# Patient Record
Sex: MALE | Race: WHITE | NOT HISPANIC OR LATINO | Employment: STUDENT | URBAN - METROPOLITAN AREA
[De-identification: names, ages, dates, MRNs, and addresses within clinical notes are randomized per-mention and may not be internally consistent; named-entity substitution may affect disease eponyms.]

---

## 2018-02-28 ENCOUNTER — APPOINTMENT (OUTPATIENT)
Dept: RADIOLOGY | Facility: MEDICAL CENTER | Age: 23
End: 2018-02-28
Attending: ANESTHESIOLOGY
Payer: COMMERCIAL

## 2018-02-28 ENCOUNTER — HOSPITAL ENCOUNTER (OUTPATIENT)
Facility: MEDICAL CENTER | Age: 23
End: 2018-02-28
Attending: ORTHOPAEDIC SURGERY | Admitting: ORTHOPAEDIC SURGERY
Payer: COMMERCIAL

## 2018-02-28 ENCOUNTER — APPOINTMENT (OUTPATIENT)
Dept: RADIOLOGY | Facility: MEDICAL CENTER | Age: 23
End: 2018-02-28
Attending: ORTHOPAEDIC SURGERY
Payer: COMMERCIAL

## 2018-02-28 VITALS
SYSTOLIC BLOOD PRESSURE: 122 MMHG | OXYGEN SATURATION: 96 % | RESPIRATION RATE: 16 BRPM | HEIGHT: 68 IN | DIASTOLIC BLOOD PRESSURE: 64 MMHG | HEART RATE: 80 BPM | BODY MASS INDEX: 19.45 KG/M2 | TEMPERATURE: 99.1 F | WEIGHT: 128.31 LBS

## 2018-02-28 DIAGNOSIS — S52.032A: ICD-10-CM

## 2018-02-28 PROCEDURE — 160046 HCHG PACU - 1ST 60 MINS PHASE II: Performed by: ORTHOPAEDIC SURGERY

## 2018-02-28 PROCEDURE — 160009 HCHG ANES TIME/MIN: Performed by: ORTHOPAEDIC SURGERY

## 2018-02-28 PROCEDURE — 700102 HCHG RX REV CODE 250 W/ 637 OVERRIDE(OP)

## 2018-02-28 PROCEDURE — A6222 GAUZE <=16 IN NO W/SAL W/O B: HCPCS | Performed by: ORTHOPAEDIC SURGERY

## 2018-02-28 PROCEDURE — 160022 HCHG BLOCK: Performed by: ORTHOPAEDIC SURGERY

## 2018-02-28 PROCEDURE — C1713 ANCHOR/SCREW BN/BN,TIS/BN: HCPCS | Performed by: ORTHOPAEDIC SURGERY

## 2018-02-28 PROCEDURE — 160048 HCHG OR STATISTICAL LEVEL 1-5: Performed by: ORTHOPAEDIC SURGERY

## 2018-02-28 PROCEDURE — 700111 HCHG RX REV CODE 636 W/ 250 OVERRIDE (IP)

## 2018-02-28 PROCEDURE — 500881 HCHG PACK, EXTREMITY: Performed by: ORTHOPAEDIC SURGERY

## 2018-02-28 PROCEDURE — 71045 X-RAY EXAM CHEST 1 VIEW: CPT

## 2018-02-28 PROCEDURE — 501838 HCHG SUTURE GENERAL: Performed by: ORTHOPAEDIC SURGERY

## 2018-02-28 PROCEDURE — 160041 HCHG SURGERY MINUTES - EA ADDL 1 MIN LEVEL 4: Performed by: ORTHOPAEDIC SURGERY

## 2018-02-28 PROCEDURE — A4565 SLINGS: HCPCS | Performed by: ORTHOPAEDIC SURGERY

## 2018-02-28 PROCEDURE — 502000 HCHG MISC OR IMPLANTS RC 0278: Performed by: ORTHOPAEDIC SURGERY

## 2018-02-28 PROCEDURE — 700101 HCHG RX REV CODE 250

## 2018-02-28 PROCEDURE — 500562 HCHG FIBERWIRE: Performed by: ORTHOPAEDIC SURGERY

## 2018-02-28 PROCEDURE — 160002 HCHG RECOVERY MINUTES (STAT): Performed by: ORTHOPAEDIC SURGERY

## 2018-02-28 PROCEDURE — 160029 HCHG SURGERY MINUTES - 1ST 30 MINS LEVEL 4: Performed by: ORTHOPAEDIC SURGERY

## 2018-02-28 PROCEDURE — 160035 HCHG PACU - 1ST 60 MINS PHASE I: Performed by: ORTHOPAEDIC SURGERY

## 2018-02-28 PROCEDURE — A9270 NON-COVERED ITEM OR SERVICE: HCPCS

## 2018-02-28 PROCEDURE — 73080 X-RAY EXAM OF ELBOW: CPT | Mod: LT

## 2018-02-28 PROCEDURE — 160025 RECOVERY II MINUTES (STATS): Performed by: ORTHOPAEDIC SURGERY

## 2018-02-28 DEVICE — SCREW CORT 3.5X28MM ST HEX (4TX6+1TX4+1TX3=31)(SDS=22): Type: IMPLANTABLE DEVICE | Site: ELBOW | Status: FUNCTIONAL

## 2018-02-28 DEVICE — SCREW CORT 3.5X24MM ST HEX (4TX6+1TX4+1TX3=31)(SDS=22): Type: IMPLANTABLE DEVICE | Site: ELBOW | Status: FUNCTIONAL

## 2018-02-28 DEVICE — WIRE K 1.6 X 150 292.16 (10EA/PK) (11TX10+2TX6+1TX20=142)(CYC=30): Type: IMPLANTABLE DEVICE | Site: ELBOW | Status: FUNCTIONAL

## 2018-02-28 DEVICE — IMPLANTABLE DEVICE: Type: IMPLANTABLE DEVICE | Site: ELBOW | Status: FUNCTIONAL

## 2018-02-28 DEVICE — SCREW CORT 3.5X26MM ST HEX (4TX6+1TX4+1TX3=31)(SDS=22): Type: IMPLANTABLE DEVICE | Site: ELBOW | Status: FUNCTIONAL

## 2018-02-28 RX ORDER — IBUPROFEN 800 MG/1
800 TABLET ORAL 2 TIMES DAILY PRN
Status: ON HOLD | COMMUNITY
End: 2018-02-28

## 2018-02-28 RX ORDER — HYDROCODONE BITARTRATE AND ACETAMINOPHEN 5; 325 MG/1; MG/1
1 TABLET ORAL 2 TIMES DAILY PRN
Status: ON HOLD | COMMUNITY
End: 2018-02-28

## 2018-02-28 RX ORDER — AZITHROMYCIN 250 MG/1
250 TABLET, FILM COATED ORAL DAILY
COMMUNITY

## 2018-02-28 RX ORDER — DIPHENHYDRAMINE HYDROCHLORIDE 50 MG/ML
25 INJECTION INTRAMUSCULAR; INTRAVENOUS EVERY 6 HOURS PRN
Status: DISCONTINUED | OUTPATIENT
Start: 2018-02-28 | End: 2018-02-28 | Stop reason: HOSPADM

## 2018-02-28 RX ORDER — IBUPROFEN 800 MG/1
800 TABLET ORAL 2 TIMES DAILY PRN
Qty: 60 TAB | Refills: 0 | Status: SHIPPED | OUTPATIENT
Start: 2018-02-28

## 2018-02-28 RX ORDER — LIDOCAINE AND PRILOCAINE 25; 25 MG/G; MG/G
1 CREAM TOPICAL
Status: DISCONTINUED | OUTPATIENT
Start: 2018-02-28 | End: 2018-02-28 | Stop reason: HOSPADM

## 2018-02-28 RX ORDER — ONDANSETRON 2 MG/ML
4 INJECTION INTRAMUSCULAR; INTRAVENOUS EVERY 4 HOURS PRN
Status: DISCONTINUED | OUTPATIENT
Start: 2018-02-28 | End: 2018-02-28 | Stop reason: HOSPADM

## 2018-02-28 RX ORDER — LIDOCAINE HYDROCHLORIDE 10 MG/ML
INJECTION, SOLUTION INFILTRATION; PERINEURAL
Status: COMPLETED
Start: 2018-02-28 | End: 2018-02-28

## 2018-02-28 RX ORDER — SCOLOPAMINE TRANSDERMAL SYSTEM 1 MG/1
1 PATCH, EXTENDED RELEASE TRANSDERMAL
Status: DISCONTINUED | OUTPATIENT
Start: 2018-02-28 | End: 2018-02-28 | Stop reason: HOSPADM

## 2018-02-28 RX ORDER — OXYCODONE HYDROCHLORIDE 5 MG/1
5 TABLET ORAL EVERY 6 HOURS PRN
Qty: 20 TAB | Refills: 0 | Status: SHIPPED | OUTPATIENT
Start: 2018-02-28 | End: 2018-03-07

## 2018-02-28 RX ORDER — MIDAZOLAM HYDROCHLORIDE 1 MG/ML
INJECTION INTRAMUSCULAR; INTRAVENOUS
Status: DISCONTINUED
Start: 2018-02-28 | End: 2018-02-28 | Stop reason: HOSPADM

## 2018-02-28 RX ORDER — LIDOCAINE HYDROCHLORIDE 10 MG/ML
0.5 INJECTION, SOLUTION INFILTRATION; PERINEURAL
Status: DISCONTINUED | OUTPATIENT
Start: 2018-02-28 | End: 2018-02-28 | Stop reason: HOSPADM

## 2018-02-28 RX ORDER — SODIUM CHLORIDE, SODIUM LACTATE, POTASSIUM CHLORIDE, CALCIUM CHLORIDE 600; 310; 30; 20 MG/100ML; MG/100ML; MG/100ML; MG/100ML
INJECTION, SOLUTION INTRAVENOUS CONTINUOUS
Status: DISCONTINUED | OUTPATIENT
Start: 2018-02-28 | End: 2018-02-28 | Stop reason: HOSPADM

## 2018-02-28 RX ORDER — OXYCODONE HYDROCHLORIDE 5 MG/1
10 TABLET ORAL EVERY 4 HOURS PRN
Status: DISCONTINUED | OUTPATIENT
Start: 2018-02-28 | End: 2018-02-28 | Stop reason: HOSPADM

## 2018-02-28 RX ORDER — HALOPERIDOL 5 MG/ML
1 INJECTION INTRAMUSCULAR EVERY 6 HOURS PRN
Status: DISCONTINUED | OUTPATIENT
Start: 2018-02-28 | End: 2018-02-28 | Stop reason: HOSPADM

## 2018-02-28 RX ORDER — OXYCODONE HYDROCHLORIDE 5 MG/1
5 TABLET ORAL EVERY 4 HOURS PRN
Status: DISCONTINUED | OUTPATIENT
Start: 2018-02-28 | End: 2018-02-28 | Stop reason: HOSPADM

## 2018-02-28 RX ORDER — DEXAMETHASONE SODIUM PHOSPHATE 4 MG/ML
4 INJECTION, SOLUTION INTRA-ARTICULAR; INTRALESIONAL; INTRAMUSCULAR; INTRAVENOUS; SOFT TISSUE
Status: DISCONTINUED | OUTPATIENT
Start: 2018-02-28 | End: 2018-02-28 | Stop reason: HOSPADM

## 2018-02-28 RX ADMIN — LIDOCAINE HYDROCHLORIDE 0.5 ML: 10 INJECTION, SOLUTION INFILTRATION; PERINEURAL at 10:10

## 2018-02-28 RX ADMIN — SODIUM CHLORIDE, SODIUM LACTATE, POTASSIUM CHLORIDE, CALCIUM CHLORIDE: 600; 310; 30; 20 INJECTION, SOLUTION INTRAVENOUS at 10:10

## 2018-02-28 ASSESSMENT — PAIN SCALES - GENERAL
PAINLEVEL_OUTOF10: 0
PAINLEVEL_OUTOF10: 4
PAINLEVEL_OUTOF10: 0
PAINLEVEL_OUTOF10: 0

## 2018-02-28 NOTE — OR NURSING
Pt to PACU s/p ORIF of left elbow. VSS throughout stay, NSR on monitor, Bps WNL. Maintaining sats on RA. Surgical incision WNL, dressing CDI, arm in sling. Tolerating PO liquids with no c/o nausea. LUE numb and heavy d/t PNB. Not requiring analgesics at this time. Updated pt and mom to POC, emotional support provided. Stable for transfer to phase two. Report to FILIPPO Gramajo.

## 2018-02-28 NOTE — OP REPORT
DATE OF SERVICE:  02/28/2018    PREOPERATIVE DIAGNOSIS:  Left displaced intraarticular olecranon fracture.    PREOPERATIVE DIAGNOSIS:  Left displaced intraarticular olecranon fracture.    PROCEDURE:  Open reduction and internal fixation, left olecranon fracture.    SURGEON:  Chance Pruett MD    ASSISTANT:  None.    ANESTHESIOLOGIST:  Andres Duff MD    ANESTHESIA:  General.    SPECIMENS:  None.    ESTIMATED BLOOD LOSS:  Minimal.    COMPLICATIONS:  None.    TOURNIQUET TIME:  1 hour at 250 mmHg.    OPERATIVE INDICATIONS:  The patient is a pleasant 22-year-old male who   sustained a ground level fall, suffered a left displaced intraarticular   olecranon fracture.  Radiographs demonstrate the fracture has a normal skin   envelope besides from a small abrasion laterally, normal neurovascular exam.    Given these findings, he is an appropriate knee candidate for open reduction   and internal fixation of the olecranon fracture.  We discussed the risks,   benefits, and alternatives including the risks of infection, wound healing   complication, neurovascular injury, blood loss, DVT, PE, malunion, nonunion,   symptomatic hardware, stiffness, and medical risk of anesthesia.  We discussed   benefits including improved chance of healing and excellent alignment.  We   discussed alternatives including nonoperative management, which I did not   recommend.  He is happy with the plan and to proceed.  Informed consent was   signed and documented.  I met with him preoperatively and marked the operative   extremity with his agreement to proceed to the operating room at [facility].    OPERATIVE COURSE:  He underwent general anesthesia with Dr. Duff.  He also   underwent regional anesthesia by my request for postoperative pain control.    He was positioned in the right lateral decubitus position with left side up.    All bony prominences were well padded.  The left lower extremity was prepped   and draped in the sterile orthopedic  fashion using ChloraPrep and the surgical   team scrubbed in.  Procedural pause was conducted to verify correct patient,   correct extremity, presence of the surgeon's initials on the operative   extremity, and administration of IV antibiotics in this case Ancef.  Following   generalized agreement, posterior approach to the olecranon was performed   incising the skin and subcutaneous tissue sharply.  The fracture site was   identified.  We had to dissect through the midsubstance of the portion of the   triceps and split this in line with the incision.  The fracture was identified   with the majority of the triceps its insertion attached.  This insertion was   split in the midsubstance in line with its fibers to allow reduction of the   fracture.  The triceps mechanism minimally elevated off the very edge of the   fracture to allow exposure of the fracture with minimal further dissection was   carried out to avoid destabilizing the triceps insertion of the lower   extremity and the fracture.  We then debrided the fracture and reduced with   2-point reduction forceps and secured with two 0.062 inch crossing K wires.  A   Synthes 2.0 Olecranon plate was then selected in position of the wound and   secured distally with one 3.5 mm bicortical nonlocking screw and proximally   with one 2.4 mm unicortical locking screw.  We then placed another screw   distally in the in compression to compress the fracture.  Radiographs   demonstrated excellent reduction of the fracture.  We then placed additional   2.5 locking fixation proximally.  Tightened all screws by hand and then   thoroughly irrigated the wound with copious amounts of normal saline.  We then   closed the triceps mechanism with a #2 FiberWire and secured the triceps   mechanism to the plate by sewing into the 2 holes in the plate.  We then   closed in layers with 2-0 Vicryl sutures, 2-0 Vicryl sutures and staples.  The   wound was anesthetized with 14 mL of  Marcaine.  This amount was used given   his prior block.  We then placed a well-padded posterior splint and after   applying sterile dressings and the patient transferred to the recovery room in   stable condition with no complications.    POSTOPERATIVE PLAN:  1.  Nonweightbearing operative extremity, keep the splint clean, dry and   intact.  2.  Deep venous thrombosis prophylaxis with SCDs in the hospital, not upon   discharge.  3.  IV antibiotics - none.  4.  Follow up with the [facility] in 2 weeks with Dr. Bryan in my   absence for a wound check.       ____________________________________     MD SONG Verduzco / TONA    DD:  02/28/2018 14:20:42  DT:  02/28/2018 15:30:31    D#:  [id number]  Job#:  [id number]

## 2018-02-28 NOTE — DISCHARGE INSTRUCTIONS
ACTIVITY: Rest and take it easy for the first 24 hours.  A responsible adult is recommended to remain with you during that time.  It is normal to feel sleepy.  We encourage you to not do anything that requires balance, judgment or coordination.    MILD FLU-LIKE SYMPTOMS ARE NORMAL. YOU MAY EXPERIENCE GENERALIZED MUSCLE ACHES, THROAT IRRITATION, HEADACHE AND/OR SOME NAUSEA.    FOR 24 HOURS DO NOT:  Drive, operate machinery or run household appliances.  Drink beer or alcoholic beverages.   Make important decisions or sign legal documents.    SPECIAL INSTRUCTIONS: Non weight bearing LEFT arm.  Refer to this sheet in the next few weeks. These instructions provide you with information about caring for yourself after your procedure. Your health care provider may also give you more specific instructions. Your treatment has been planned according to current medical practices, but problems sometimes occur. Call your health care provider if you have any problems or questions after your procedure.  WHAT TO EXPECT AFTER THE PROCEDURE  After your procedure, it is common to have:  · Soreness.  · Mild swelling.  · Stiffness.  HOME CARE INSTRUCTIONS  If You Have a Splint:  · Wear it as directed by your health care provider. Remove it only as directed by your health care provider.  · Loosen the splint if your fingers become numb and tingle, or if they turn cold and blue.   Bathing  · Do not take baths, swim, or use a hot tub until your health care provider approves. Ask your health care provider if you can take showers. You may only be allowed to take sponge baths for bathing.  · If your health care provider approves bathing and showering, cover the splint with a watertight plastic bag to protect it from water. Do not let the splint get wet. If you were given a removable splint to wear, only remove it for bathing as directed by your health care provider.  · Keep the bandage (dressing) dry until your health care provider says it  can be removed.  Managing Pain, Stiffness, and Swelling  · Move your fingers often to avoid stiffness and to lessen swelling.  · Raise the injured area above the level of your heart while you are sitting or lying down.  · If directed, apply ice to the incision:  ¨ Put ice in a plastic bag.  ¨ Place a towel between your skin and the bag.  ¨ Leave the ice on for 20 minutes, 2-3 times per day.  Driving  · Do not drive or operate heavy machinery while taking pain medicine.  · Do not drive while wearing a splint on a hand that you use for driving.  Activity  · Return to your normal activities as directed by your health care provider. Ask your health care provider what activities are safe for you.  · Perform range-of-motion exercises only as directed by your health care provider.  Incision Care  · There are many different ways to close and cover an incision, including stitches (sutures), staples, and adhesive strips. Follow instructions from your health care provider about:  ¨ Incision care.  ¨ Dressing changes and removal.  ¨ Incision closure removal.  · Check your incision area every day for signs of infection. Watch for:  ¨ Redness, swelling, or pain.  ¨ Fluid, blood, or pus.  General Instructions  · Do not put pressure on any part of the splint until it is fully hardened. This may take several hours.  · If you were given a sling, wear it as directed by your health care provider.  · Keep the splint clean and dry.  · Do not use any tobacco products, including cigarettes, chewing tobacco, or electronic cigarettes. Tobacco can delay bone healing. If you need help quitting, ask your health care provider.  · Take medicines only as directed by your health care provider.  · Keep all follow-up visits as directed by your health care provider. This is important.  · Do not use the injured limb to support your body weight until your health care provider says that you can.  SEEK MEDICAL CARE IF:  · You have redness, swelling, or  "pain at the site of your incision.  · You have fluid, blood, or pus coming from your incision.  · You have a fever or chills.  · You notice a bad smell coming from your incision or your dressing.  · The edges of your incision break open after the sutures or staples have been removed.  SEEK IMMEDIATE MEDICAL CARE IF:  · You develop a rash.  · You have difficulty breathing.  · You have numbness or tingling in your hand or forearm.      Peripheral Nerve Block Discharge Instructions from Same Day Surgery and Inpatient :    What to Expect - Upper Extremity  · You may experience numbness and weakness in arm  on the same side as your surgery  · This is normal. For some people, this may be an unpleasant sensation. Be very careful with your numb limb  · Ask for help when you need it  Shoulder Surgery Side Effects  · In addition to numbness and weakness you may experience other symptoms  · Other nerves that are close to those nerves injected can also be affected by local anesthesia  · You may experience a hoarseness in your voice  · Your breathing may feel different  · You may also notice drooping of your eyelid, pupil constriction, and decreased sweating, on the side of your surgery  · All of these side effects are normal and will resolve when the local anesthetic wears off   Prevent Injury  · Protect the limb like a baby  · Beware of exposing your limb to extreme heat or cold or trauma  · The limb may be injured without you noticing because it is numb  · Keep the limb elevated whenever possible  · Do not sleep on the limb  · Change the position of the limb regularly  · Avoid putting pressure on your surgical limb  Pain Control  · The initial block on the day of surgery will make your extremity feel \"numb\"  · Any consecutive injection including prior to discharge from the hospital will make your extremity feel \"numb\"  · You may feel an aching or burning when the local anesthesia starts to wear off  · Take pain pills as " prescribed by your surgeon  · Call your surgeon or anesthesiologist if you do not have adequate pain control           DIET: To avoid nausea, slowly advance diet as tolerated, avoiding spicy or greasy foods for the first day.  Add more substantial food to your diet according to your physician's instructions.  Babies can be fed formula or breast milk as soon as they are hungry.  INCREASE FLUIDS AND FIBER TO AVOID CONSTIPATION.    SURGICAL DRESSING/BATHING: Shower with wound covered starting tomorrow morning.     FOLLOW-UP APPOINTMENT:  A follow-up appointment should be arranged with your doctor in 1-2 weeks call to schedule.    You should CALL YOUR PHYSICIAN if you develop:  Fever greater than 101 degrees F.  Pain not relieved by medication, or persistent nausea or vomiting.  Excessive bleeding (blood soaking through dressing) or unexpected drainage from the wound.  Extreme redness or swelling around the incision site, drainage of pus or foul smelling drainage.  Inability to urinate or empty your bladder within 8 hours.  Problems with breathing or chest pain.    You should call 911 if you develop problems with breathing or chest pain.  If you are unable to contact your doctor or surgical center, you should go to the nearest emergency room or urgent care center.  Physician's telephone #: [phone].    If any questions arise, call your doctor.  If your doctor is not available, please feel free to call the Surgical Center at [phone].  The Center is open Monday through Friday from 7AM to 7PM.  You can also call the [employer] HOTLINE open 24 hours/day, 7 days/week and speak to a nurse at [phone], or toll free at [phone].    A registered nurse may call you a few days after your surgery to see how you are doing after your procedure.    MEDICATIONS: Resume taking daily medication.  Take prescribed pain medication with food.  If no medication is prescribed, you may take non-aspirin pain medication if  needed.  PAIN MEDICATION CAN BE VERY CONSTIPATING.  Take a stool softener or laxative such as senokot, pericolace, or milk of magnesia if needed.    Prescription given for Roxycodone and Ibuprofen.  If your physician has prescribed pain medication that includes Acetaminophen (Tylenol), do not take additional Acetaminophen (Tylenol) while taking the prescribed medication.    Depression / Suicide Risk    As you are discharged from this [facility] facility, it is important to learn how to keep safe from harming yourself.    Recognize the warning signs:  · Abrupt changes in personality, positive or negative- including increase in energy   · Giving away possessions  · Change in eating patterns- significant weight changes-  positive or negative  · Change in sleeping patterns- unable to sleep or sleeping all the time   · Unwillingness or inability to communicate  · Depression  · Unusual sadness, discouragement and loneliness  · Talk of wanting to die  · Neglect of personal appearance   · Rebelliousness- reckless behavior  · Withdrawal from people/activities they love  · Confusion- inability to concentrate     If you or a loved one observes any of these behaviors or has concerns about self-harm, here's what you can do:  · Talk about it- your feelings and reasons for harming yourself  · Remove any means that you might use to hurt yourself (examples: pills, rope, extension cords, firearm)  · Get professional help from the community (Mental Health, Substance Abuse, psychological counseling)  · Do not be alone:Call your Safe Contact- someone whom you trust who will be there for you.  · Call your local CRISIS HOTLINE 282-2352 or 342-884-8332  · Call your local Children's Mobile Crisis Response Team [place] (344) 488-0229 or www.[employer]  · Call the toll free National Suicide Prevention Hotlines   · National Suicide Prevention Lifeline 013-324-EYMW (3440)  · National Hope Line Network 800-SUICIDE (123-3745)

## 2019-07-21 ENCOUNTER — APPOINTMENT (OUTPATIENT)
Dept: RADIOLOGY | Facility: MEDICAL CENTER | Age: 24
End: 2019-07-21
Attending: EMERGENCY MEDICINE
Payer: COMMERCIAL

## 2019-07-21 ENCOUNTER — HOSPITAL ENCOUNTER (EMERGENCY)
Facility: MEDICAL CENTER | Age: 24
End: 2019-07-21
Attending: EMERGENCY MEDICINE
Payer: COMMERCIAL

## 2019-07-21 VITALS
HEART RATE: 69 BPM | DIASTOLIC BLOOD PRESSURE: 62 MMHG | WEIGHT: 115 LBS | RESPIRATION RATE: 16 BRPM | HEIGHT: 69 IN | TEMPERATURE: 98.1 F | SYSTOLIC BLOOD PRESSURE: 110 MMHG | BODY MASS INDEX: 17.03 KG/M2 | OXYGEN SATURATION: 95 %

## 2019-07-21 DIAGNOSIS — S02.2XXA CLOSED FRACTURE OF NASAL BONE, INITIAL ENCOUNTER: ICD-10-CM

## 2019-07-21 DIAGNOSIS — S00.81XA ABRASION OF FACE, INITIAL ENCOUNTER: ICD-10-CM

## 2019-07-21 DIAGNOSIS — S09.90XA CLOSED HEAD INJURY, INITIAL ENCOUNTER: ICD-10-CM

## 2019-07-21 PROCEDURE — 99285 EMERGENCY DEPT VISIT HI MDM: CPT

## 2019-07-21 PROCEDURE — 72125 CT NECK SPINE W/O DYE: CPT

## 2019-07-21 PROCEDURE — 70450 CT HEAD/BRAIN W/O DYE: CPT

## 2019-07-21 PROCEDURE — 70486 CT MAXILLOFACIAL W/O DYE: CPT

## 2019-07-21 RX ORDER — LIDOCAINE HYDROCHLORIDE AND EPINEPHRINE 10; 10 MG/ML; UG/ML
INJECTION, SOLUTION INFILTRATION; PERINEURAL
Status: DISCONTINUED
Start: 2019-07-21 | End: 2019-07-21 | Stop reason: HOSPADM

## 2019-07-21 RX ORDER — EPINEPHRINE NASAL SOLUTION 1 MG/ML
SOLUTION NASAL
Status: DISCONTINUED
Start: 2019-07-21 | End: 2019-07-21 | Stop reason: HOSPADM

## 2019-07-21 NOTE — ED PROVIDER NOTES
"ED Provider Note    CHIEF COMPLAINT  Chief Complaint   Patient presents with   • Facial Injury     Seen at 2:33 AM    HPI  Jaime Lassiter is a 24 y.o. male who presents with a probable nose fracture.  The history is extremely limited.  The patient is intoxicated.  He apparently fell forward.  He is unsure if he had loss of consciousness.  Apparently he had significant bleeding prior to my evaluation due to a nasal fracture.  He denies any medical history.  He denies any nausea or vomiting.  He denies any neck pain numbness or weakness.    REVIEW OF SYSTEMS  See HPI  PAST MEDICAL HISTORY   Denies.    SOCIAL HISTORY  Social History     Social History Main Topics   • Smoking status: Current Some Day Smoker     Types: Cigarettes   • Smokeless tobacco: Never Used   • Alcohol use Yes      Comment: occassionally- weekends   • Drug use: Yes     Types: Inhaled      Comment: galileo   • Sexual activity: Not on file       SURGICAL HISTORY   has a past surgical history that includes elbow orif (Left, 2/28/2018).    CURRENT MEDICATIONS  Reviewed.  See Encounter Summary.     ALLERGIES  No Known Allergies    PHYSICAL EXAM  VITAL SIGNS: /64   Pulse 74   Temp 36.7 °C (98.1 °F) (Temporal)   Resp 19   Ht 1.753 m (5' 9\")   Wt 52.2 kg (115 lb)   SpO2 96%   BMI 16.98 kg/m²   Constitutional: Intoxicated, somnolent but easily arousable.  HENT: Normocephalic, no raccoon eyes, no ortiz signs, no hemotympanum.  Bilateral external ears normal. Nose normal.  Ecchymosis at the bridge of the nose with some swelling.  No septal deviation but significant ecchymosis from the bilateral nares and dried blood in the oropharynx.  No loose teeth, normal occlusive service, normal dentition.  No midline cervical neck tenderness.  Eyes: Conjunctiva normal, non-icteric, EOMI.    Thorax & Lungs: Easy unlabored respirations, CTA  Cardiovascular: Borderline tachycardic  Abdomen:  No distention  Skin: Visualized skin is  Dry, No erythema, No " rash.   Extremities:   atraumatic   Neurologic: Alert, Grossly non-focal.  Slurred speech, intoxicated.  Psychiatric: Affect and Mood normal    RADIOLOGY  CT-HEAD W/O   Final Result      No acute intracranial abnormality.               INTERPRETING LOCATION:  [address] [facility], [place], [address]      CT-CSPINE WITHOUT PLUS RECONS   Final Result      No acute fracture or traumatic subluxation.      CT-MAXILLOFACIAL W/O PLUS RECONS   Final Result      Nasal fractures as described above.          4 AM-after copious irrigation of the facial wounds and removal of all of the dried blood the nares were visualized, the patient does not have any septal hematoma, he has 2 small lacerations on the bridge of the nose that are less than 5 mm each and well approximated.  These do not require sutures.    Nursing notes and vital signs were reviewed. (See chart for details)    Decision Making:  This is a 24 y.o. year old male who presents with significant alcohol intoxication.  He has obvious facial trauma.  He could not be cleared using [facility] or [place] head CT criteria.  For this reason the patient underwent CT of the head and cervical spine, both are negative for fracture or hemorrhage.  He did have some significant bleeding around the naris and a CT axial fascia was completed.  The patient does have some nasal bone fractures as suspected but no other sinus fractures.  He does not have a septal hematoma, he does not have any large lacerations that require repair.  The patient's immunizations are up-to-date.  He was discharged with a referral for ENT though likely this will not require any specific intervention or follow-up.    DISPOSITION:  Patient will be discharged home in good condition.    Discharge Medications:  New Prescriptions    No medications on file       The patient was discharged home (see d/c instructions) and told to return immediately for any signs or symptoms listed, or any worsening at all.  The patient verbally agreed  to the discharge precautions and follow-up plan which is documented in EPIC.          FINAL IMPRESSION  1. Abrasion of face, initial encounter    2. Closed head injury, initial encounter    3. Closed fracture of nasal bone, initial encounter

## (undated) DEVICE — CHLORAPREP 26 ML APPLICATOR - ORANGE TINT(25/CA)

## (undated) DEVICE — ELECTRODE 850 FOAM ADHESIVE - HYDROGEL RADIOTRNSPRNT (50/PK)

## (undated) DEVICE — LACTATED RINGERS INJ 1000 ML - (14EA/CA 60CA/PF)

## (undated) DEVICE — SET LEADWIRE 5 LEAD BEDSIDE DISPOSABLE ECG (1SET OF 5/EA)

## (undated) DEVICE — NEPTUNE 4 PORT MANIFOLD - (20/PK)

## (undated) DEVICE — TUBING CLEARLINK DUO-VENT - C-FLO (48EA/CA)

## (undated) DEVICE — STOCKINET BIAS 4 IN STERILE - (20/CA)

## (undated) DEVICE — PAD LAP STERILE 18 X 18 - (5/PK 40PK/CA)

## (undated) DEVICE — SLEEVE, VASO, THIGH, MED

## (undated) DEVICE — SUTURE 2-0 VICRYL PLUS CT-1 - 8 X 18 INCH(12/BX)

## (undated) DEVICE — FIBERWIRE 2.0 (12EA/BX)

## (undated) DEVICE — GLOVE BIOGEL PI INDICATOR SZ 7.0 SURGICAL PF LF - (50/BX 4BX/CA)

## (undated) DEVICE — SUTURE 0 VICRYL PLUS CT-1 - 8 X 18 INCH (12/BX)

## (undated) DEVICE — BLOCK

## (undated) DEVICE — HEAD HOLDER JUNIOR/ADULT

## (undated) DEVICE — PROTECTOR ULNA NERVE - (36PR/CA)

## (undated) DEVICE — DRILL BIT QC 2.0 140MM DEPTH MRK - (6TX2+1TX3=15)

## (undated) DEVICE — PACK LOWER EXTREMITY - (2/CA)

## (undated) DEVICE — GLOVE BIOGEL SZ 7 SURGICAL PF LTX - (50PR/BX 4BX/CA)

## (undated) DEVICE — ELECTRODE DUAL RETURN W/ CORD - (50/PK)

## (undated) DEVICE — KIT ANESTHESIA W/CIRCUIT & 3/LT BAG W/FILTER (20EA/CA)

## (undated) DEVICE — TOWELS CLOTH SURGICAL - (4/PK 20PK/CA)

## (undated) DEVICE — SODIUM CHL IRRIGATION 0.9% 1000ML (12EA/CA)

## (undated) DEVICE — GLOVE BIOGEL PI INDICATOR SZ 6.5 SURGICAL PF LF - (50/BX 4BX/CA)

## (undated) DEVICE — CANISTER SUCTION 3000ML MECHANICAL FILTER AUTO SHUTOFF MEDI-VAC NONSTERILE LF DISP  (40EA/CA)

## (undated) DEVICE — SLING ORTH UNV TIETX VLFM ARM

## (undated) DEVICE — GLOVE BIOGEL INDICATOR SZ 7.5 SURGICAL PF LTX - (50PR/BX 4BX/CA)

## (undated) DEVICE — SUCTION INSTRUMENT YANKAUER BULBOUS TIP W/O VENT (50EA/CA)

## (undated) DEVICE — SUTURE GENERAL

## (undated) DEVICE — MASK ANESTHESIA ADULT  - (100/CA)

## (undated) DEVICE — DRILL BIT 2.5 TWIST 310.25 (8TX2+1TX3=19)

## (undated) DEVICE — GLOVE BIOGEL SZ 6.5 SURGICAL PF LTX (50PR/BX 4BX/CA)

## (undated) DEVICE — SPONGE GAUZESTER 4 X 4 4PLY - (128PK/CA)

## (undated) DEVICE — SUTURE 3-0 ETHILON PS-1 (36PK/BX)

## (undated) DEVICE — DRAPE 36X28IN RAD CARM BND BG - (25/CA) O

## (undated) DEVICE — SENSOR SPO2 NEO LNCS ADHESIVE (20/BX) SEE USER NOTES

## (undated) DEVICE — GLOVE SZ 7 BIOGEL PI MICRO - PF LF (50PR/BX 4BX/CA)

## (undated) DEVICE — GLOVE BIOGEL SZ 7.5 SURGICAL PF LTX - (50PR/BX 4BX/CA)

## (undated) DEVICE — DRESSING XEROFORM 1X8 - (50/BX 4BX/CA)

## (undated) DEVICE — SET EXTENSION WITH 2 PORTS (48EA/CA) ***PART #2C8610 IS A SUBSTITUTE*****

## (undated) DEVICE — GOWN SURGEONS LARGE - (32/CA)

## (undated) DEVICE — COTTON ROLL 1 POUND BIOSEAL - (5RL/CA)

## (undated) DEVICE — GOWN WARMING STANDARD FLEX - (30/CA)